# Patient Record
(demographics unavailable — no encounter records)

---

## 2025-03-18 NOTE — HISTORY OF PRESENT ILLNESS
[FreeTextEntry1] : 27 yo  here for annual exam last [facility] [employer] 2 yrs ago pt has had 2 doses of gardasil is due for the next dose soon no sig med hx taking loloestrin, she does not like that she has no period since october despite taking the placebo pills on no other meds NKDA

## 2025-03-18 NOTE — PLAN
[FreeTextEntry1] : [employer] pap sent discussed her options for birth control pt wans to return to Junel 1/20 that she was on prior to the loloestrin n/v 1 yr or PRN pt will do Gardasil and work she is an ER nurse

## 2025-03-18 NOTE — PHYSICAL EXAM
[Chaperone Present] : A chaperone was present in the examining room during all aspects of the physical examination [Soft] : soft [Non-tender] : non-tender [Non-distended] : non-distended [No Mass] : no mass [Oriented x3] : oriented x3 [Examination Of The Breasts] : a normal appearance [No Masses] : no breast masses were palpable [Labia Majora] : normal [Labia Minora] : normal [Normal] : normal [Uterine Adnexae] : normal